# Patient Record
Sex: MALE | Race: WHITE | Employment: OTHER | URBAN - METROPOLITAN AREA
[De-identification: names, ages, dates, MRNs, and addresses within clinical notes are randomized per-mention and may not be internally consistent; named-entity substitution may affect disease eponyms.]

---

## 2024-01-29 ENCOUNTER — TELEPHONE (OUTPATIENT)
Age: 69
End: 2024-01-29

## 2024-01-29 NOTE — TELEPHONE ENCOUNTER
Patients ears feel like they have a lot of wax build up, especially the right one. He is requesting to get them flushed out. Ok to schedule ear irrigation with nurse? Please advise [phone]

## 2024-01-30 ENCOUNTER — NURSE ONLY (OUTPATIENT)
Age: 69
End: 2024-01-30

## 2024-01-30 NOTE — PROGRESS NOTES
Patient is  here  for  right  ear  flush  using  warm  water  and  H2O2   patient  tolerated the   ear  flush  well  curette  was  used  by  Dr. Kasper

## 2024-05-17 ENCOUNTER — NURSE ONLY (OUTPATIENT)
Age: 69
End: 2024-05-17

## 2024-05-17 ENCOUNTER — TELEPHONE (OUTPATIENT)
Age: 69
End: 2024-05-17

## 2024-05-17 DIAGNOSIS — H61.23 BILATERAL IMPACTED CERUMEN: Primary | ICD-10-CM

## 2024-05-17 NOTE — TELEPHONE ENCOUNTER
Patient calling stating that he is having issues with his ears again. Right one mainly, but they feel full. He would like to know if he can come in today for an ear cleaning?

## 2024-05-17 NOTE — PROGRESS NOTES
Patient is here for right ear irrigation. Tolerated well. Patient had a small amount of wax removed from both ears. Approved by Dr. Kasper  Patient is wondering if there is something he can do to prevent having to come in for ear flushes so often. I suggested debrox ear drops or at home ear flushes. Patient is wondering if you think ear drops would be beneficial for him.  Aretha in [place] for pharmacy.

## 2024-07-30 ENCOUNTER — OFFICE VISIT (OUTPATIENT)
Age: 69
End: 2024-07-30
Payer: MEDICARE

## 2024-07-30 VITALS
HEIGHT: 67 IN | OXYGEN SATURATION: 97 % | WEIGHT: 114 LBS | BODY MASS INDEX: 17.89 KG/M2 | HEART RATE: 88 BPM | DIASTOLIC BLOOD PRESSURE: 74 MMHG | SYSTOLIC BLOOD PRESSURE: 130 MMHG

## 2024-07-30 DIAGNOSIS — R35.0 BENIGN PROSTATIC HYPERPLASIA WITH URINARY FREQUENCY: Primary | ICD-10-CM

## 2024-07-30 DIAGNOSIS — N40.1 BENIGN PROSTATIC HYPERPLASIA WITH URINARY FREQUENCY: Primary | ICD-10-CM

## 2024-07-30 PROBLEM — I10 ESSENTIAL HYPERTENSION: Status: ACTIVE | Noted: 2017-05-02

## 2024-07-30 PROBLEM — R31.29 MICROSCOPIC HEMATURIA: Status: ACTIVE | Noted: 2024-07-30

## 2024-07-30 PROBLEM — N99.89 POSTOPERATIVE RETENTION OF URINE: Status: ACTIVE | Noted: 2024-07-30

## 2024-07-30 PROBLEM — I25.10 CORONARY ARTERY DISEASE INVOLVING NATIVE CORONARY ARTERY OF NATIVE HEART WITHOUT ANGINA PECTORIS: Status: ACTIVE | Noted: 2017-05-02

## 2024-07-30 PROBLEM — R33.8 POSTOPERATIVE RETENTION OF URINE: Status: ACTIVE | Noted: 2024-07-30

## 2024-07-30 PROBLEM — I42.9 CARDIOMYOPATHY (HCC): Status: ACTIVE | Noted: 2017-05-02

## 2024-07-30 PROCEDURE — G8427 DOCREV CUR MEDS BY ELIG CLIN: HCPCS | Performed by: FAMILY MEDICINE

## 2024-07-30 PROCEDURE — 3078F DIAST BP <80 MM HG: CPT | Performed by: FAMILY MEDICINE

## 2024-07-30 PROCEDURE — 3075F SYST BP GE 130 - 139MM HG: CPT | Performed by: FAMILY MEDICINE

## 2024-07-30 PROCEDURE — G8419 CALC BMI OUT NRM PARAM NOF/U: HCPCS | Performed by: FAMILY MEDICINE

## 2024-07-30 PROCEDURE — 1036F TOBACCO NON-USER: CPT | Performed by: FAMILY MEDICINE

## 2024-07-30 PROCEDURE — 3017F COLORECTAL CA SCREEN DOC REV: CPT | Performed by: FAMILY MEDICINE

## 2024-07-30 PROCEDURE — 1123F ACP DISCUSS/DSCN MKR DOCD: CPT | Performed by: FAMILY MEDICINE

## 2024-07-30 PROCEDURE — 99213 OFFICE O/P EST LOW 20 MIN: CPT | Performed by: FAMILY MEDICINE

## 2024-07-30 RX ORDER — ATORVASTATIN CALCIUM 10 MG/1
10 TABLET, FILM COATED ORAL DAILY
COMMUNITY
End: 2024-07-30 | Stop reason: ALTCHOICE

## 2024-07-30 RX ORDER — LATANOPROST 50 UG/ML
1 SOLUTION/ DROPS OPHTHALMIC NIGHTLY
COMMUNITY

## 2024-07-30 RX ORDER — DORZOLAMIDE HYDROCHLORIDE AND TIMOLOL MALEATE 20; 5 MG/ML; MG/ML
1 SOLUTION/ DROPS OPHTHALMIC 2 TIMES DAILY
COMMUNITY

## 2024-07-30 RX ORDER — CARVEDILOL 3.12 MG/1
3.12 TABLET ORAL DAILY
COMMUNITY
End: 2024-07-30 | Stop reason: ALTCHOICE

## 2024-07-30 RX ORDER — DUTASTERIDE 0.5 MG/1
0.5 CAPSULE, LIQUID FILLED ORAL
COMMUNITY
Start: 2019-07-26 | End: 2024-07-30

## 2024-07-30 RX ORDER — BUMETANIDE 0.5 MG/1
0.5 TABLET ORAL PRN
COMMUNITY
Start: 2017-05-02 | End: 2024-07-30

## 2024-07-30 RX ORDER — ASPIRIN 81 MG/1
81 TABLET ORAL DAILY
COMMUNITY
End: 2024-07-30 | Stop reason: ALTCHOICE

## 2024-07-30 RX ORDER — LISINOPRIL 5 MG/1
5 TABLET ORAL DAILY
COMMUNITY
End: 2024-07-30 | Stop reason: ALTCHOICE

## 2024-07-30 SDOH — ECONOMIC STABILITY: HOUSING INSECURITY
IN THE LAST 12 MONTHS, WAS THERE A TIME WHEN YOU DID NOT HAVE A STEADY PLACE TO SLEEP OR SLEPT IN A SHELTER (INCLUDING NOW)?: NO

## 2024-07-30 SDOH — ECONOMIC STABILITY: FOOD INSECURITY: WITHIN THE PAST 12 MONTHS, THE FOOD YOU BOUGHT JUST DIDN'T LAST AND YOU DIDN'T HAVE MONEY TO GET MORE.: NEVER TRUE

## 2024-07-30 SDOH — ECONOMIC STABILITY: INCOME INSECURITY: HOW HARD IS IT FOR YOU TO PAY FOR THE VERY BASICS LIKE FOOD, HOUSING, MEDICAL CARE, AND HEATING?: NOT HARD AT ALL

## 2024-07-30 SDOH — ECONOMIC STABILITY: FOOD INSECURITY: WITHIN THE PAST 12 MONTHS, YOU WORRIED THAT YOUR FOOD WOULD RUN OUT BEFORE YOU GOT MONEY TO BUY MORE.: NEVER TRUE

## 2024-07-30 ASSESSMENT — PATIENT HEALTH QUESTIONNAIRE - PHQ9
SUM OF ALL RESPONSES TO PHQ QUESTIONS 1-9: 0
SUM OF ALL RESPONSES TO PHQ QUESTIONS 1-9: 0
SUM OF ALL RESPONSES TO PHQ9 QUESTIONS 1 & 2: 0
SUM OF ALL RESPONSES TO PHQ QUESTIONS 1-9: 0
SUM OF ALL RESPONSES TO PHQ QUESTIONS 1-9: 0
2. FEELING DOWN, DEPRESSED OR HOPELESS: NOT AT ALL
1. LITTLE INTEREST OR PLEASURE IN DOING THINGS: NOT AT ALL

## 2024-07-30 NOTE — PROGRESS NOTES
[facility]  [facility] MEDICINE  [address]  [place] [address]     Date of Visit:  2024  Patient Name: Jacky Damon   Patient :  1955     CHIEF COMPLAINT/HPI:     Chief Complaint   Patient presents with    Annual Exam        HPI      Jacky Damon, 69 y.o. presents today for routine care.      Will be going on a cruise with his two sisters in February.      His weight is stable at 114 pounds.  He is still on mobile meals.  He does walk a lot daily and has never been a huge eater.  He had extensive work up a few years ago for weight loss and everything.  His weight has been stable for the last couple of years.     He continues to urinate frequently.  He went thru work up with uro.      REVIEW OF SYSTEM      Review of Systems:   Constitutional:  Negative for chills, fatigue, fever and unexpected weight change.   Eyes:  Negative for visual disturbance.   Respiratory:  Negative for cough, chest tightness, shortness of breath and wheezing.    Cardiovascular:  Negative for chest pain, palpitations and leg swelling.   Gastrointestinal:  Negative for abdominal distention, abdominal pain, blood in stool, constipation, diarrhea, nausea and vomiting.     REVIEWED INFORMATION      Allergies   Allergen Reactions    Penicillins        Current Outpatient Medications   Medication Sig Dispense Refill    dorzolamide-timolol (COSOPT) 2-0.5 % ophthalmic solution Place 1 drop into both eyes 2 times daily      latanoprost (XALATAN) 0.005 % ophthalmic solution Place 1 drop into both eyes nightly       No current facility-administered medications for this visit.        Patient Active Problem List   Diagnosis    Coronary artery disease involving native coronary artery of native heart without angina pectoris    Essential hypertension    Microscopic hematuria    Postoperative retention of urine       History reviewed. No pertinent past medical history.    History reviewed. No pertinent surgical

## 2024-08-01 PROBLEM — I42.9 CARDIOMYOPATHY (HCC): Status: RESOLVED | Noted: 2017-05-02 | Resolved: 2024-08-01

## 2024-12-05 ENCOUNTER — TELEPHONE (OUTPATIENT)
Age: 69
End: 2024-12-05

## 2024-12-05 NOTE — TELEPHONE ENCOUNTER
Patient said on Tuesday he started sneezing, cough congestion, green/yellow mucus, rattling in chest. No fever, no chills no body aches.     Patient wanting to come in today but nothing open. Can something be sent in??     Aretha MUÑIZ

## 2024-12-07 SDOH — HEALTH STABILITY: PHYSICAL HEALTH: ON AVERAGE, HOW MANY DAYS PER WEEK DO YOU ENGAGE IN MODERATE TO STRENUOUS EXERCISE (LIKE A BRISK WALK)?: 3 DAYS

## 2024-12-07 SDOH — HEALTH STABILITY: PHYSICAL HEALTH: ON AVERAGE, HOW MANY MINUTES DO YOU ENGAGE IN EXERCISE AT THIS LEVEL?: 30 MIN

## 2024-12-07 ASSESSMENT — PATIENT HEALTH QUESTIONNAIRE - PHQ9
1. LITTLE INTEREST OR PLEASURE IN DOING THINGS: NOT AT ALL
SUM OF ALL RESPONSES TO PHQ9 QUESTIONS 1 & 2: 0
SUM OF ALL RESPONSES TO PHQ QUESTIONS 1-9: 0
2. FEELING DOWN, DEPRESSED OR HOPELESS: NOT AT ALL
SUM OF ALL RESPONSES TO PHQ QUESTIONS 1-9: 0

## 2024-12-07 ASSESSMENT — LIFESTYLE VARIABLES
HOW MANY STANDARD DRINKS CONTAINING ALCOHOL DO YOU HAVE ON A TYPICAL DAY: PATIENT DOES NOT DRINK
HOW MANY STANDARD DRINKS CONTAINING ALCOHOL DO YOU HAVE ON A TYPICAL DAY: 0
HOW OFTEN DO YOU HAVE A DRINK CONTAINING ALCOHOL: NEVER
HOW OFTEN DO YOU HAVE SIX OR MORE DRINKS ON ONE OCCASION: 1
HOW OFTEN DO YOU HAVE A DRINK CONTAINING ALCOHOL: 1

## 2024-12-10 ENCOUNTER — OFFICE VISIT (OUTPATIENT)
Age: 69
End: 2024-12-10

## 2024-12-10 VITALS
DIASTOLIC BLOOD PRESSURE: 78 MMHG | HEIGHT: 69 IN | WEIGHT: 112 LBS | OXYGEN SATURATION: 98 % | BODY MASS INDEX: 16.59 KG/M2 | SYSTOLIC BLOOD PRESSURE: 130 MMHG | HEART RATE: 74 BPM | TEMPERATURE: 98 F

## 2024-12-10 DIAGNOSIS — Z00.00 MEDICARE ANNUAL WELLNESS VISIT, SUBSEQUENT: Primary | ICD-10-CM

## 2024-12-10 DIAGNOSIS — J01.00 ACUTE NON-RECURRENT MAXILLARY SINUSITIS: ICD-10-CM

## 2024-12-10 RX ORDER — AZITHROMYCIN 250 MG/1
TABLET, FILM COATED ORAL
Qty: 6 TABLET | Refills: 0 | Status: SHIPPED | OUTPATIENT
Start: 2024-12-10 | End: 2024-12-20

## 2024-12-10 NOTE — PATIENT INSTRUCTIONS
Chest pain or pressure, or a strange feeling in the chest.     Sweating.     Shortness of breath.     Pain, pressure, or a strange feeling in the back, neck, jaw, or upper belly or in one or both shoulders or arms.     Lightheadedness or sudden weakness.     A fast or irregular heartbeat.   After you call 911, the  may tell you to chew 1 adult-strength or 2 to 4 low-dose aspirin. Wait for an ambulance. Do not try to drive yourself.  Watch closely for changes in your health, and be sure to contact your doctor if you have any problems.  Where can you learn more?  Go to https://www.[employer].net/patientEd and enter F075 to learn more about \"A Healthy Heart: Care Instructions.\"  Current as of: June 24, 2023  Content Version: 14.2  © 2024 [employer].   Care instructions adapted under license by [employer]. If you have questions about a medical condition or this instruction, always ask your healthcare professional. [employer] disclaims any warranty or liability for your use of this information.      Personalized Preventive Plan for Jacky Damon - 12/10/2024  Medicare offers a range of preventive health benefits. Some of the tests and screenings are paid in full while other may be subject to a deductible, co-insurance, and/or copay.    Some of these benefits include a comprehensive review of your medical history including lifestyle, illnesses that may run in your family, and various assessments and screenings as appropriate.    After reviewing your medical record and screening and assessments performed today your provider may have ordered immunizations, labs, imaging, and/or referrals for you.  A list of these orders (if applicable) as well as your Preventive Care list are included within your After Visit Summary for your review.    Other Preventive Recommendations:    A preventive eye exam performed by an eye specialist is recommended every 1-2 years to screen for glaucoma;

## 2024-12-10 NOTE — PROGRESS NOTES
Medicare Annual Wellness Visit    Jacky Damon is here for Medicare AWV (Patient is here for a medicare wellness visit with chest and head congestion and cough for about a week )    Assessment & Plan   Medicare annual wellness visit, subsequent  Body mass index (BMI) less than 16.5  Acute non-recurrent maxillary sinusitis  -     azithromycin (ZITHROMAX) 250 MG tablet; 500mg on day 1 followed by 250mg on days 2 - 5, Disp-6 tablet, R-0Normal  Recommendations for Preventive Services Due: see orders and patient instructions/AVS.  Recommended screening schedule for the next 5-10 years is provided to the patient in written form: see Patient Instructions/AVS.     Return in 6 months (on 6/10/2025).     Subjective   The following acute and/or chronic problems were also addressed today:    He has been having congestion, cough, and runny nose for the last week.  No fever or chills.  Symptoms are not improving.      Patient's complete Health Risk Assessment and screening values have been reviewed and are found in Flowsheets. The following problems were reviewed today and where indicated follow up appointments were made and/or referrals ordered.    Positive Risk Factor Screenings with Interventions:     Cognitive:   Clock Drawing Test (CDT): (!) Abnormal  Words recalled: 0 Words Recalled  Total Score: (!) 0  Total Score Interpretation: Abnormal Mini-Cog  Interventions:  Patient comments: at [facility]              Abnormal BMI (underweight):  Body mass index is 16.54 kg/m². (!) Abnormal  Interventions:Procedures  Weight has been stable for the last few years, has undergone extensive work up that was all negative    Dentist Screen:  Have you seen the dentist within the past year?: (!) No    Intervention:  See AVS for additional education material                      Objective   Vitals:    12/10/24 1426   BP: 130/78   Pulse: 74   Temp: 98 °F (36.7 °C)   SpO2: 98%   Weight: 50.8 kg (112 lb)   Height: 1.753 m (5' 9\")      Body mass

## 2025-06-16 ENCOUNTER — TELEPHONE (OUTPATIENT)
Age: 70
End: 2025-06-16

## 2025-06-16 NOTE — TELEPHONE ENCOUNTER
PT wanting ear flush for bilat ear impaction, he had this done appox 1 year ago and is prone to having wax buildup. A nurse visit can be scheduled once approved.

## 2025-06-17 ENCOUNTER — CLINICAL SUPPORT (OUTPATIENT)
Age: 70
End: 2025-06-17

## 2025-06-17 DIAGNOSIS — H60.8X3 CHRONIC ECZEMATOUS OTITIS EXTERNA OF BOTH EARS: Primary | ICD-10-CM

## 2025-06-17 RX ORDER — NEOMYCIN SULFATE, POLYMYXIN B SULFATE AND HYDROCORTISONE 3.5; 10000; 1 MG/ML; [IU]/ML; MG/ML
4 SOLUTION AURICULAR (OTIC) 3 TIMES DAILY
Qty: 10 ML | Refills: 0 | Status: SHIPPED | OUTPATIENT
Start: 2025-06-17 | End: 2025-07-04

## 2025-06-17 SDOH — ECONOMIC STABILITY: FOOD INSECURITY: WITHIN THE PAST 12 MONTHS, YOU WORRIED THAT YOUR FOOD WOULD RUN OUT BEFORE YOU GOT MONEY TO BUY MORE.: NEVER TRUE

## 2025-06-17 SDOH — ECONOMIC STABILITY: FOOD INSECURITY: WITHIN THE PAST 12 MONTHS, THE FOOD YOU BOUGHT JUST DIDN'T LAST AND YOU DIDN'T HAVE MONEY TO GET MORE.: NEVER TRUE

## 2025-06-17 ASSESSMENT — PATIENT HEALTH QUESTIONNAIRE - PHQ9
SUM OF ALL RESPONSES TO PHQ QUESTIONS 1-9: 0
1. LITTLE INTEREST OR PLEASURE IN DOING THINGS: NOT AT ALL
SUM OF ALL RESPONSES TO PHQ QUESTIONS 1-9: 0
2. FEELING DOWN, DEPRESSED OR HOPELESS: NOT AT ALL

## 2025-06-17 NOTE — PROGRESS NOTES
Ear Cerumen Removal Procedure Note    Indication: ear cerumen impaction    Procedure: After placing the patient's head in the appropriate position, the patient's both ear canals was irrigated with the appropriate solution until the majority of the cerumen was flushed out of the canal.  The remaining cerumen was removed by curette.     The patient tolerated the procedure well.    Complications: None    Patient with very small narrow and tortuous ear canals- combined flushing/curette technique needed- script for cortisporin ear drops for eczematous otitis sent- patient advised sweet oil daily and debrox once monthly to try and keep ears clear